# Patient Record
Sex: MALE | Race: WHITE | NOT HISPANIC OR LATINO | Employment: OTHER | URBAN - METROPOLITAN AREA
[De-identification: names, ages, dates, MRNs, and addresses within clinical notes are randomized per-mention and may not be internally consistent; named-entity substitution may affect disease eponyms.]

---

## 2018-03-14 ENCOUNTER — TRANSCRIBE ORDERS (OUTPATIENT)
Dept: CARDIOLOGY | Facility: HOSPITAL | Age: 63
End: 2018-03-14

## 2018-03-14 DIAGNOSIS — R94.39 ABNORMAL STRESS TEST: Primary | ICD-10-CM

## 2018-03-16 ENCOUNTER — HOSPITAL ENCOUNTER (OUTPATIENT)
Facility: HOSPITAL | Age: 63
Discharge: HOME | End: 2018-03-16
Attending: INTERNAL MEDICINE | Admitting: INTERNAL MEDICINE

## 2018-03-16 VITALS
RESPIRATION RATE: 18 BRPM | DIASTOLIC BLOOD PRESSURE: 69 MMHG | BODY MASS INDEX: 26.45 KG/M2 | HEIGHT: 75 IN | WEIGHT: 212.74 LBS | HEART RATE: 85 BPM | SYSTOLIC BLOOD PRESSURE: 101 MMHG | OXYGEN SATURATION: 96 % | TEMPERATURE: 98.7 F

## 2018-03-16 DIAGNOSIS — R94.39 ABNORMAL STRESS TEST: ICD-10-CM

## 2018-03-16 LAB
ANION GAP SERPL CALCULATED.3IONS-SCNC: 11 MMOL/L (ref 3–11)
BUN BLD-MCNC: 25 MG/DL (ref 9–23)
BUN/CREAT SERPL: 27.8 (ref 7–25)
CALCIUM SPEC-SCNC: 9.1 MG/DL (ref 8.7–10.4)
CHLORIDE SERPL-SCNC: 100 MMOL/L (ref 99–109)
CO2 SERPL-SCNC: 22 MMOL/L (ref 20–31)
CREAT BLD-MCNC: 0.9 MG/DL (ref 0.6–1.3)
DEPRECATED RDW RBC AUTO: 37.6 FL (ref 37–54)
ERYTHROCYTE [DISTWIDTH] IN BLOOD BY AUTOMATED COUNT: 12.4 % (ref 11.3–14.5)
GFR SERPL CREATININE-BSD FRML MDRD: 85 ML/MIN/1.73
GLUCOSE BLD-MCNC: 230 MG/DL (ref 70–100)
GLUCOSE BLDC GLUCOMTR-MCNC: 226 MG/DL (ref 70–130)
HCT VFR BLD AUTO: 44.8 % (ref 38.9–50.9)
HGB BLD-MCNC: 16.3 G/DL (ref 13.1–17.5)
MCH RBC QN AUTO: 30.6 PG (ref 27–31)
MCHC RBC AUTO-ENTMCNC: 36.4 G/DL (ref 32–36)
MCV RBC AUTO: 84.2 FL (ref 80–99)
PLATELET # BLD AUTO: 153 10*3/MM3 (ref 150–450)
PMV BLD AUTO: 11.7 FL (ref 6–12)
POTASSIUM BLD-SCNC: 4.2 MMOL/L (ref 3.5–5.5)
RBC # BLD AUTO: 5.32 10*6/MM3 (ref 4.2–5.76)
SODIUM BLD-SCNC: 133 MMOL/L (ref 132–146)
WBC NRBC COR # BLD: 10.04 10*3/MM3 (ref 3.5–10.8)

## 2018-03-16 PROCEDURE — 25010000002 MIDAZOLAM PER 1 MG: Performed by: INTERNAL MEDICINE

## 2018-03-16 PROCEDURE — 0 IOPAMIDOL PER 1 ML

## 2018-03-16 PROCEDURE — 25010000002 FENTANYL CITRATE (PF) 100 MCG/2ML SOLUTION: Performed by: INTERNAL MEDICINE

## 2018-03-16 PROCEDURE — 25010000002 HEPARIN (PORCINE) PER 1000 UNITS: Performed by: INTERNAL MEDICINE

## 2018-03-16 PROCEDURE — 82962 GLUCOSE BLOOD TEST: CPT

## 2018-03-16 PROCEDURE — C1894 INTRO/SHEATH, NON-LASER: HCPCS | Performed by: INTERNAL MEDICINE

## 2018-03-16 PROCEDURE — C1769 GUIDE WIRE: HCPCS | Performed by: INTERNAL MEDICINE

## 2018-03-16 PROCEDURE — 85027 COMPLETE CBC AUTOMATED: CPT | Performed by: INTERNAL MEDICINE

## 2018-03-16 PROCEDURE — 80048 BASIC METABOLIC PNL TOTAL CA: CPT | Performed by: INTERNAL MEDICINE

## 2018-03-16 PROCEDURE — 93458 L HRT ARTERY/VENTRICLE ANGIO: CPT | Performed by: INTERNAL MEDICINE

## 2018-03-16 PROCEDURE — 36415 COLL VENOUS BLD VENIPUNCTURE: CPT

## 2018-03-16 RX ORDER — HYDROCODONE BITARTRATE AND ACETAMINOPHEN 5; 325 MG/1; MG/1
1 TABLET ORAL EVERY 4 HOURS PRN
Status: DISCONTINUED | OUTPATIENT
Start: 2018-03-16 | End: 2018-03-16 | Stop reason: HOSPADM

## 2018-03-16 RX ORDER — SODIUM CHLORIDE 9 MG/ML
250 INJECTION, SOLUTION INTRAVENOUS CONTINUOUS
Status: ACTIVE | OUTPATIENT
Start: 2018-03-16 | End: 2018-03-16

## 2018-03-16 RX ORDER — ALPRAZOLAM 0.25 MG/1
0.25 TABLET ORAL 3 TIMES DAILY PRN
Status: DISCONTINUED | OUTPATIENT
Start: 2018-03-16 | End: 2018-03-16 | Stop reason: HOSPADM

## 2018-03-16 RX ORDER — TEMAZEPAM 7.5 MG/1
7.5 CAPSULE ORAL NIGHTLY PRN
Status: DISCONTINUED | OUTPATIENT
Start: 2018-03-16 | End: 2018-03-16 | Stop reason: HOSPADM

## 2018-03-16 RX ORDER — LIDOCAINE HYDROCHLORIDE 10 MG/ML
INJECTION, SOLUTION EPIDURAL; INFILTRATION; INTRACAUDAL; PERINEURAL AS NEEDED
Status: DISCONTINUED | OUTPATIENT
Start: 2018-03-16 | End: 2018-03-16 | Stop reason: HOSPADM

## 2018-03-16 RX ORDER — DEXTROSE MONOHYDRATE 25 G/50ML
25 INJECTION, SOLUTION INTRAVENOUS
Status: DISCONTINUED | OUTPATIENT
Start: 2018-03-16 | End: 2018-03-16 | Stop reason: HOSPADM

## 2018-03-16 RX ORDER — ASPIRIN 325 MG
325 TABLET, DELAYED RELEASE (ENTERIC COATED) ORAL DAILY
Status: DISCONTINUED | OUTPATIENT
Start: 2018-03-16 | End: 2018-03-16 | Stop reason: HOSPADM

## 2018-03-16 RX ORDER — MIDAZOLAM HYDROCHLORIDE 1 MG/ML
INJECTION INTRAMUSCULAR; INTRAVENOUS AS NEEDED
Status: DISCONTINUED | OUTPATIENT
Start: 2018-03-16 | End: 2018-03-16 | Stop reason: HOSPADM

## 2018-03-16 RX ORDER — MORPHINE SULFATE 2 MG/ML
1 INJECTION, SOLUTION INTRAMUSCULAR; INTRAVENOUS EVERY 4 HOURS PRN
Status: DISCONTINUED | OUTPATIENT
Start: 2018-03-16 | End: 2018-03-16 | Stop reason: HOSPADM

## 2018-03-16 RX ORDER — METHADONE HYDROCHLORIDE 10 MG/1
10 TABLET ORAL 3 TIMES DAILY
COMMUNITY

## 2018-03-16 RX ORDER — NICOTINE POLACRILEX 4 MG
15 LOZENGE BUCCAL
Status: DISCONTINUED | OUTPATIENT
Start: 2018-03-16 | End: 2018-03-16 | Stop reason: HOSPADM

## 2018-03-16 RX ORDER — FENTANYL CITRATE 50 UG/ML
INJECTION, SOLUTION INTRAMUSCULAR; INTRAVENOUS AS NEEDED
Status: DISCONTINUED | OUTPATIENT
Start: 2018-03-16 | End: 2018-03-16 | Stop reason: HOSPADM

## 2018-03-16 RX ORDER — SODIUM CHLORIDE 9 MG/ML
INJECTION, SOLUTION INTRAVENOUS CONTINUOUS PRN
Status: DISCONTINUED | OUTPATIENT
Start: 2018-03-16 | End: 2018-03-16 | Stop reason: HOSPADM

## 2018-03-16 RX ORDER — ACETAMINOPHEN 325 MG/1
650 TABLET ORAL EVERY 4 HOURS PRN
Status: DISCONTINUED | OUTPATIENT
Start: 2018-03-16 | End: 2018-03-16 | Stop reason: HOSPADM

## 2018-03-16 RX ORDER — NALOXONE HCL 0.4 MG/ML
0.4 VIAL (ML) INJECTION
Status: DISCONTINUED | OUTPATIENT
Start: 2018-03-16 | End: 2018-03-16 | Stop reason: HOSPADM

## 2018-03-16 RX ORDER — VALSARTAN 160 MG/1
160 TABLET ORAL DAILY
COMMUNITY

## 2018-03-16 RX ADMIN — ASPIRIN 325 MG: 325 TABLET, DELAYED RELEASE ORAL at 07:44

## 2023-07-12 NOTE — H&P (VIEW-ONLY)
Cardiac Electrophysiology Outpatient Note  [facility] at [facility]    Office Visit     Jordy Negron  [id number]  07/11/2023    Primary Care Physician: Darell Hitchcock MD    Referred By: Jason Piña MD    Subjective     Chief Complaint   Patient presents with    PAF       History of Present Illness:   Jordy Negron is a 68 y.o. male who presents to my electrophysiology clinic for evaluation for paroxysmal atrial fibrillation.  He says he was diagnosed with atrial fibrillation approximately 1 year ago.  He was alerted via his watch that he was in atrial fibrillation.  He discussed with his primary care practitioner who performed an EKG to confirm this.  He then was seen by Dr. Piña.  He was started on Xarelto at this time.  He has had reports of significant bleeding on Xarelto.  He was found to have an abnormality in his small intestine that will require surgery.  They were wanting his anticoagulation regimen to be clarified prior to this.  He is referred here for further evaluation of his atrial fibrillation.    He overall has been asymptomatic in atrial fibrillation.  His only indication that he was in atrial fibrillation was via his Apple Watch.  Since his initial episode he has not had any recurrent atrial fibrillation per his Apple Watch.  Past Medical History:   Diagnosis Date    Back pain     Chest pain     Diabetes mellitus     Dizziness     Hepatitis C     Hypertension     Irregular heart beat     Joint stiffness     Numbness     Shortness of breath        Past Surgical History:   Procedure Laterality Date    CARDIAC CATHETERIZATION N/A 3/16/2018    Procedure: Left Heart Cath;  Surgeon: Jason Piña MD;  Location: [facility] CATH INVASIVE LOCATION;  Service: Cardiovascular    CATARACT EXTRACTION         Family History   Problem Relation Age of Onset    Stroke Father     Colon cancer Brother        Social History     Socioeconomic History    Marital  "status:    Tobacco Use    Smoking status: Former     Packs/day: 1.00     Years: 40.00     Pack years: 40.00     Types: Cigarettes     Quit date: 2019     Years since quittin.5    Smokeless tobacco: Never   Vaping Use    Vaping Use: Never used   Substance and Sexual Activity    Alcohol use: No    Drug use: Not Currently     Comment: \"Lortab\"    Sexual activity: Defer         Current Outpatient Medications:     meclizine (ANTIVERT) 25 MG tablet, Take 1 tablet by mouth Every 8 (Eight) Hours As Needed., Disp: , Rfl:     metFORMIN (GLUCOPHAGE) 500 MG tablet, Take 1 tablet by mouth 2 (Two) Times a Day With Meals., Disp: , Rfl:     methadone (DOLOPHINE) 10 MG tablet, Take 1 tablet by mouth 3 (Three) Times a Day,, Disp: , Rfl:     metoprolol succinate XL (TOPROL-XL) 50 MG 24 hr tablet, Take 1 tablet by mouth Daily., Disp: , Rfl:     pantoprazole (PROTONIX) 40 MG EC tablet, Take 1 tablet by mouth Daily., Disp: , Rfl:     valsartan (DIOVAN) 160 MG tablet, Take 1 tablet by mouth Daily., Disp: , Rfl:     Allergies:   No Known Allergies    Objective   Vital Signs: Blood pressure 140/70, pulse 61, height 188 cm (74\"), weight 95.8 kg (211 lb 3.2 oz), SpO2 97 %.    PHYSICAL EXAM  General appearance: Awake, alert, cooperative  Head: Normocephalic, without obvious abnormality, atraumatic  Neck: No JVD  Lungs: Clear to ascultation bilaterally  Heart: Regular rate and rhythm, no murmurs, 2+ LE pulses, no lower extremity swelling  Skin: Skin color, turgor normal, no rashes or lesions  Neurologic: Grossly normal     Lab Results   Component Value Date    GLUCOSE 230 (H) 2018    CALCIUM 9.1 2018     2018    K 4.2 2018    CO2 22.0 2018     2018    BUN 25 (H) 2018    CREATININE 0.90 2018    EGFRIFNONA 85 2018    BCR 27.8 (H) 2018    ANIONGAP 11.0 2018     Lab Results   Component Value Date    WBC 10.04 2018    HGB 16.3 2018    HCT 44.8 " 03/16/2018    MCV 84.2 03/16/2018     03/16/2018     No results found for: INR, PROTIME  No results found for: TSH, A9CJVXD, Z6YLAVX, THYROIDAB          Results for orders placed during the hospital encounter of 03/16/18    Cardiac Catheterization/Vascular Study    Narrative  Cardiac Catheterization    Referring Provider:  Sanju Hitchcock MD    Indication(s) for this Procedure:  USA and abnormal stress test    Procedure(s) Performed: Left heart catheterization, coronary angiography, left ventriculography    Description of the Procedure:  Informed consent was obtained.  A sheath was placed a left radial artery and selective angiography of the right left coronary arteries as well as left heart catheterization left ventriculography was performed.  Procedure is terminated and the sheath was removed with the TR band and there were no early complications.      Angiographic Findings:  Coronary dominance, right  · LM:   normal  · LAD:  normal  · LCX:  normal  · RCA:  normal    LV: LVEF  70%      Hemodynamic Findings:  Ao pressure:  60/60 mmHg  LVEDP:  10 mmHg      EBL: None  Specimen(s): None obtained    Complications: There were no early complications.    Final Impression(s):    Normal coronary arteries  Falsely abnormal stress test  Non-cardiac symptoms    Recommendations:    Evaluation noncardiac causes of symptoms      I personally viewed and interpreted the patient's EKG/Telemetry/lab data      ECG 12 Lead    Date/Time: 7/11/2023 8:38 PM  Performed by: Laurent Gutierrez MD  Authorized by: Laurent Gutierrez MD   Previous ECG: no previous ECG available  Comments: Normal sinus rhythm without abnormality        Jordy Negron  reports that he quit smoking about 4 years ago. His smoking use included cigarettes. He has a 40.00 pack-year smoking history. He has never used smokeless tobacco..         Advance Care Planning   Advance Care Planning: ACP discussion was held with the patient during this visit. Patient does not  have an advance directive, declines further assistance.     Assessment & Plan    1. Paroxysmal atrial fibrillation  Patient has a diagnosis of paroxysmal atrial fibrillation.  This appears to have been a single episode that was initially detected by his Apple watch.  I reviewed all outside records that were available, unfortunately I cannot find any confirmation of atrial fibrillation.  No EKG I can find shows atrial fibrillation, and ambulatory monitor he wore has no atrial fibrillation.  I do not doubt that he did have prior atrial fibrillation, however I would like to confirm this and we will work on getting records.  He has had problems with bleeding due to an abnormality in his small intestine, the character of which I am uncertain.  He says he is going to need surgery for this.  He has been off Xarelto due to bleeding.    We discussed treatment options going forward including Watchman device.  We discussed how this is performed including the pros and cons.  He does have a [id number] score of 3.  However, at the current time it seems as though he has a very low atrial fibrillation burden with no recently detected atrial fibrillation.  We also discussed that a another option would be to proceed with strict monitoring for atrial fibrillation, and as long as he is not having any, we could hold off on resuming anticoagulation or a Watchman device.  This would be best accomplished with a loop recorder.  We discussed how this is performed and he is interested in proceeding with this.  We did discuss that should he have significant atrial fibrillation on his loop recorder, that we would then need to either proceed with a Watchman device versus resume anticoagulation.  Of note, with the watchman he would need to be on aspirin and Plavix for 6 months followed by aspirin indefinitely.  From my standpoint, he should be okay now to proceed with any potential GI surgery.       Follow Up:  No follow-ups on file.      Thank  you for allowing me to participate in the care of your patient. Please do not hesitate to contact me with additional questions or concerns.      Laurent Gutierrez M.D.  Cardiac Electrophysiologist  [facility] / [facility]

## 2023-07-19 PROBLEM — R94.39 ABNORMAL STRESS TEST: Status: RESOLVED | Noted: 2018-03-14 | Resolved: 2023-07-19

## 2023-07-20 PROBLEM — R94.39 ABNORMAL STRESS TEST: Status: ACTIVE | Noted: 2023-07-20

## 2023-07-24 DIAGNOSIS — I48.0 PAF (PAROXYSMAL ATRIAL FIBRILLATION): Primary | ICD-10-CM

## 2023-07-26 ENCOUNTER — HOSPITAL ENCOUNTER (OUTPATIENT)
Dept: CARDIOLOGY | Facility: HOSPITAL | Age: 68
Setting detail: HOSPITAL OUTPATIENT SURGERY
Discharge: HOME | End: 2023-07-26
Attending: STUDENT IN AN ORGANIZED HEALTH CARE EDUCATION/TRAINING PROGRAM | Admitting: STUDENT IN AN ORGANIZED HEALTH CARE EDUCATION/TRAINING PROGRAM
Payer: MEDICARE

## 2023-07-26 VITALS
HEART RATE: 62 BPM | OXYGEN SATURATION: 96 % | DIASTOLIC BLOOD PRESSURE: 85 MMHG | WEIGHT: 207.67 LBS | SYSTOLIC BLOOD PRESSURE: 154 MMHG | HEIGHT: 75 IN | TEMPERATURE: 97.7 F | RESPIRATION RATE: 16 BRPM | BODY MASS INDEX: 25.82 KG/M2

## 2023-07-26 DIAGNOSIS — I48.0 PAF (PAROXYSMAL ATRIAL FIBRILLATION): ICD-10-CM

## 2023-07-26 PROCEDURE — C1764 EVENT RECORDER, CARDIAC: HCPCS

## 2023-07-26 PROCEDURE — 25010000002 MIDAZOLAM PER 1 MG: Performed by: STUDENT IN AN ORGANIZED HEALTH CARE EDUCATION/TRAINING PROGRAM

## 2023-07-26 PROCEDURE — 33285 INSJ SUBQ CAR RHYTHM MNTR: CPT | Performed by: STUDENT IN AN ORGANIZED HEALTH CARE EDUCATION/TRAINING PROGRAM

## 2023-07-26 PROCEDURE — 33285 INSJ SUBQ CAR RHYTHM MNTR: CPT

## 2023-07-26 RX ORDER — NALOXONE HCL 0.4 MG/ML
0.4 VIAL (ML) INJECTION ONCE AS NEEDED
Status: DISCONTINUED | OUTPATIENT
Start: 2023-07-26 | End: 2023-07-26 | Stop reason: HOSPADM

## 2023-07-26 RX ORDER — MIDAZOLAM HYDROCHLORIDE 1 MG/ML
INJECTION INTRAMUSCULAR; INTRAVENOUS
Status: COMPLETED | OUTPATIENT
Start: 2023-07-26 | End: 2023-07-26

## 2023-07-26 RX ORDER — ACETAMINOPHEN 325 MG/1
650 TABLET ORAL EVERY 4 HOURS PRN
Status: DISCONTINUED | OUTPATIENT
Start: 2023-07-26 | End: 2023-07-26 | Stop reason: HOSPADM

## 2023-07-26 RX ORDER — FLUMAZENIL 0.1 MG/ML
0.5 INJECTION INTRAVENOUS ONCE AS NEEDED
Status: DISCONTINUED | OUTPATIENT
Start: 2023-07-26 | End: 2023-07-26 | Stop reason: HOSPADM

## 2023-07-26 RX ORDER — MIDAZOLAM HYDROCHLORIDE 1 MG/ML
2-8 INJECTION INTRAMUSCULAR; INTRAVENOUS ONCE AS NEEDED
Status: DISCONTINUED | OUTPATIENT
Start: 2023-07-26 | End: 2023-07-26 | Stop reason: HOSPADM

## 2023-07-26 RX ORDER — NITROGLYCERIN 0.4 MG/1
0.4 TABLET SUBLINGUAL
Status: DISCONTINUED | OUTPATIENT
Start: 2023-07-26 | End: 2023-07-26 | Stop reason: HOSPADM

## 2023-07-26 RX ORDER — FENTANYL CITRATE 50 UG/ML
50-100 INJECTION, SOLUTION INTRAMUSCULAR; INTRAVENOUS ONCE AS NEEDED
Status: DISCONTINUED | OUTPATIENT
Start: 2023-07-26 | End: 2023-07-26 | Stop reason: HOSPADM

## 2023-07-26 RX ADMIN — MIDAZOLAM HYDROCHLORIDE 2 MG: 1 INJECTION, SOLUTION INTRAMUSCULAR; INTRAVENOUS at 12:00

## 2023-07-26 RX ADMIN — MIDAZOLAM HYDROCHLORIDE 2 MG: 1 INJECTION, SOLUTION INTRAMUSCULAR; INTRAVENOUS at 11:56

## 2023-07-26 NOTE — INTERVAL H&P NOTE
H&P reviewed. The patient was examined and there are no changes to the H&P.      Electronically signed by SIMON Melendez, 07/26/23, 11:32 AM EDT.

## 2023-08-04 ENCOUNTER — TELEPHONE (OUTPATIENT)
Dept: CARDIOLOGY | Facility: CLINIC | Age: 68
End: 2023-08-04

## 2023-08-04 NOTE — TELEPHONE ENCOUNTER
BSC Loop recorder transmission revealed  episodes afib/aflutter with controlled response. Longest episode 23 mins 12 seconds. Occasional PVCs are noted on EGMs as well. I have posted below snipped copies of the EGMs with the gain increased. It is difficult to assess the episodes without the gain increase in MURJ. He is currently not on antiocoagulation therapy    Called patient he is currently in [facility]. He says he has been there for days and has had no symptoms. Denies any CP, Palipations, shortness of breath. Report is in MURJ for review.

## 2023-09-08 PROCEDURE — 93298 REM INTERROG DEV EVAL SCRMS: CPT | Performed by: STUDENT IN AN ORGANIZED HEALTH CARE EDUCATION/TRAINING PROGRAM

## 2023-09-08 PROCEDURE — G2066 INTER DEVC REMOTE 30D: HCPCS | Performed by: STUDENT IN AN ORGANIZED HEALTH CARE EDUCATION/TRAINING PROGRAM

## 2023-09-12 ENCOUNTER — DOCUMENTATION (OUTPATIENT)
Dept: CARDIOLOGY | Facility: CLINIC | Age: 68
End: 2023-09-12
Payer: MEDICARE

## 2023-09-12 NOTE — PROGRESS NOTES
Per [employer] remote transmission from pt's loop recorder, received today, 9/12/2023:     Six episodes of Afib yesterday, 9/11/2023-see below  Longest episode 44 min, 54 seconds.  Mean V-rates: 105 bpm; Max V-rates-160 bpm.    Pt is NOT taking a NOAC.           The EGM below was gained up to 10 mm/mV

## 2023-09-13 NOTE — PROGRESS NOTES
I spoke with patient who reports he's scheduled for a Whipple procedure on 9/20/2023. Pt states he will follow up about his Afib after the surgery.

## 2023-10-03 ENCOUNTER — TELEPHONE (OUTPATIENT)
Dept: CARDIOLOGY | Facility: CLINIC | Age: 68
End: 2023-10-03
Payer: MEDICARE

## 2023-10-03 NOTE — TELEPHONE ENCOUNTER
Message received from [employer] web site- “No message received for 21 days.”  Last home monitor communication with the [employer] web site was on 9/20/2023.    I spoke with pt who reports he is being discharged home today, he had a Whipple surgical procedure on 9/20/2023.

## 2023-10-05 ENCOUNTER — TELEPHONE (OUTPATIENT)
Dept: CARDIOLOGY | Facility: CLINIC | Age: 68
End: 2023-10-05
Payer: MEDICARE

## 2023-10-05 NOTE — TELEPHONE ENCOUNTER
Remote monitor reading received showing AF on his loop recorder-  longest 9 hrs 44 min- average rate 124 bpm.  Just got home this week from Whipple procedure that was done 9/20/23.  He was discharged on Lovenox.  He is taking Metoprolol XL 50 mg daily.  Going to get f/u appt with Dr Gutierrez but he said it needs to be in a month due to healing from surgery.

## 2023-10-18 ENCOUNTER — TELEPHONE (OUTPATIENT)
Dept: CARDIOLOGY | Facility: CLINIC | Age: 68
End: 2023-10-18
Payer: MEDICARE

## 2023-10-18 NOTE — TELEPHONE ENCOUNTER
Per remote monitoring: Patient had 6 episodes of atrial fib/flutter. Current episode was ongoing at time of transmission. Ventricular rate is controlled in the 80 bpm range. It has been difficult to assess his remote transmissions in the past due to the baseline. I was able to increase the gain of most recent episode and posted snip-it  of rhythm below for review. Called patient, He reports palpitations at times, denies soa, edema, or CP. He is currently recovering from Whipple surgery he had 09/20/2023. I reviewed medications with patient, he reports he is currently taking Metoprolol XL 25mg Daily, Valsartan, Simethicone, Doxycycline, Fluticonazole 40mg, Pancrelitase Creon, Pantoprazole, Metformin and Methadone 20mg Daily. He also has a PICC line and is taking IV antibiotics. He reports he took his last dose of Lovenox yesterday. He is unable to verbalize to me some of the doses on his medication. He is not currently on anticoagulation therapy. Report is in MURJ for review.

## 2023-10-19 NOTE — TELEPHONE ENCOUNTER
Message sent to Maria Teresa Connor in scheduling to schedule patient for a appointment to see  in the next couple of weeks.

## 2024-04-01 ENCOUNTER — TELEPHONE (OUTPATIENT)
Dept: CARDIOLOGY | Facility: CLINIC | Age: 69
End: 2024-04-01
Payer: MEDICARE

## 2024-04-01 NOTE — TELEPHONE ENCOUNTER
Per remote transmission: Patient continues to have episodes of afib. Canceled his 11/14/2023 appointment. Attempted to contact patient to see if he wanted to reschedule follow up. Left message.

## 2024-04-15 ENCOUNTER — TELEPHONE (OUTPATIENT)
Dept: CARDIOLOGY | Facility: CLINIC | Age: 69
End: 2024-04-15
Payer: MEDICARE

## 2024-04-15 NOTE — TELEPHONE ENCOUNTER
Called Mr Negron to verify if he is taking Xarelto.  Office note from Eva Lott notes he is taking Xarelto but it isn't on his medication list.  Remote monitor reading is showing some AFLeft message for him to return my call to verify.

## 2024-04-16 RX ORDER — FLECAINIDE ACETATE 50 MG/1
50 TABLET ORAL EVERY 12 HOURS
COMMUNITY

## 2024-04-16 NOTE — TELEPHONE ENCOUNTER
Mr Negron returned my call and he is taking Xarelto and Flecainide.  These medications weren't on his medication list.  Asked him who is refilling his medications. He said he thinks he has ran out of refills on Xarelto.  He reports he sees Dr Piña.  Called Dr Piña's office and they are going to send in refill for Xarelto.  Verified doses of Flecainide 50 mg bid and Xarelto 20 mg daily.  Debra in Dr Piña's office said they do not follow [employer] devices remotely.  Will continue to monitor on our end.

## 2024-05-06 ENCOUNTER — TELEPHONE (OUTPATIENT)
Dept: CARDIOLOGY | Facility: CLINIC | Age: 69
End: 2024-05-06
Payer: MEDICARE

## 2024-05-09 ENCOUNTER — DOCUMENTATION (OUTPATIENT)
Dept: CARDIOLOGY | Facility: CLINIC | Age: 69
End: 2024-05-09
Payer: MEDICARE

## (undated) DEVICE — CATH DIAG EXPO .045 FL3  5F 100CM

## (undated) DEVICE — GLIDESHEATH SLENDER STAINLESS STEEL KIT: Brand: GLIDESHEATH SLENDER

## (undated) DEVICE — GW INQWIRE FC PTFE STD J/1.5 .035 260

## (undated) DEVICE — CATH DIAG EXPO M/ PK 5F FL4/FR4 PIG

## (undated) DEVICE — DEV COMP RAD PRELUDESYNC 24CM